# Patient Record
Sex: FEMALE | Race: WHITE | NOT HISPANIC OR LATINO | URBAN - METROPOLITAN AREA
[De-identification: names, ages, dates, MRNs, and addresses within clinical notes are randomized per-mention and may not be internally consistent; named-entity substitution may affect disease eponyms.]

---

## 2017-01-30 ENCOUNTER — OFFICE VISIT - HEALTHEAST (OUTPATIENT)
Dept: FAMILY MEDICINE | Facility: CLINIC | Age: 22
End: 2017-01-30

## 2017-01-30 DIAGNOSIS — Z48.02 ENCOUNTER FOR REMOVAL OF SUTURES: ICD-10-CM

## 2017-02-02 ENCOUNTER — OFFICE VISIT - HEALTHEAST (OUTPATIENT)
Dept: OTOLARYNGOLOGY | Facility: CLINIC | Age: 22
End: 2017-02-02

## 2017-02-02 DIAGNOSIS — J35.1 TONSILLAR HYPERTROPHY: ICD-10-CM

## 2017-02-02 DIAGNOSIS — J35.01 CHRONIC TONSILLITIS: ICD-10-CM

## 2017-02-02 ASSESSMENT — MIFFLIN-ST. JEOR: SCORE: 1669.15

## 2017-02-04 ENCOUNTER — COMMUNICATION - HEALTHEAST (OUTPATIENT)
Dept: FAMILY MEDICINE | Facility: CLINIC | Age: 22
End: 2017-02-04

## 2017-02-04 DIAGNOSIS — F33.1 MAJOR DEPRESSIVE DISORDER, RECURRENT EPISODE, MODERATE (H): ICD-10-CM

## 2017-04-19 ENCOUNTER — OFFICE VISIT - HEALTHEAST (OUTPATIENT)
Dept: FAMILY MEDICINE | Facility: CLINIC | Age: 22
End: 2017-04-19

## 2017-04-19 DIAGNOSIS — J35.01 CHRONIC TONSILLITIS: ICD-10-CM

## 2017-04-19 DIAGNOSIS — Z01.810 PREOP CARDIOVASCULAR EXAM: ICD-10-CM

## 2017-04-19 DIAGNOSIS — F32.1 MODERATE MAJOR DEPRESSION (H): ICD-10-CM

## 2017-04-19 DIAGNOSIS — F10.21 ALCOHOLISM IN REMISSION (H): ICD-10-CM

## 2017-04-19 ASSESSMENT — MIFFLIN-ST. JEOR: SCORE: 1725.39

## 2017-04-20 ENCOUNTER — ANESTHESIA - HEALTHEAST (OUTPATIENT)
Dept: SURGERY | Facility: CLINIC | Age: 22
End: 2017-04-20

## 2017-04-20 ENCOUNTER — COMMUNICATION - HEALTHEAST (OUTPATIENT)
Dept: FAMILY MEDICINE | Facility: CLINIC | Age: 22
End: 2017-04-20

## 2017-04-20 ASSESSMENT — MIFFLIN-ST. JEOR: SCORE: 1725.81

## 2017-04-21 ENCOUNTER — SURGERY - HEALTHEAST (OUTPATIENT)
Dept: SURGERY | Facility: CLINIC | Age: 22
End: 2017-04-21

## 2017-04-21 ASSESSMENT — MIFFLIN-ST. JEOR: SCORE: 1720.63

## 2017-05-18 ENCOUNTER — RECORDS - HEALTHEAST (OUTPATIENT)
Dept: ADMINISTRATIVE | Facility: OTHER | Age: 22
End: 2017-05-18

## 2017-05-22 ENCOUNTER — OFFICE VISIT - HEALTHEAST (OUTPATIENT)
Dept: FAMILY MEDICINE | Facility: CLINIC | Age: 22
End: 2017-05-22

## 2017-05-22 DIAGNOSIS — F10.20 ALCOHOLISM (H): ICD-10-CM

## 2017-05-22 DIAGNOSIS — Z90.89 S/P TONSILLECTOMY: ICD-10-CM

## 2017-05-22 DIAGNOSIS — F17.200 NICOTINE DEPENDENCE: ICD-10-CM

## 2017-05-22 ASSESSMENT — MIFFLIN-ST. JEOR: SCORE: 1790.49

## 2017-05-25 ENCOUNTER — COMMUNICATION - HEALTHEAST (OUTPATIENT)
Dept: FAMILY MEDICINE | Facility: CLINIC | Age: 22
End: 2017-05-25

## 2017-05-25 DIAGNOSIS — F33.1 MAJOR DEPRESSIVE DISORDER, RECURRENT EPISODE, MODERATE (H): ICD-10-CM

## 2017-11-08 ENCOUNTER — RECORDS - HEALTHEAST (OUTPATIENT)
Dept: ADMINISTRATIVE | Facility: OTHER | Age: 22
End: 2017-11-08

## 2017-12-03 ENCOUNTER — COMMUNICATION - HEALTHEAST (OUTPATIENT)
Dept: FAMILY MEDICINE | Facility: CLINIC | Age: 22
End: 2017-12-03

## 2017-12-03 DIAGNOSIS — N92.6 IRREGULAR MENSES: ICD-10-CM

## 2021-05-30 VITALS — HEIGHT: 66 IN | BODY MASS INDEX: 32.78 KG/M2 | WEIGHT: 204 LBS

## 2021-05-30 VITALS — HEIGHT: 66 IN | WEIGHT: 216.4 LBS | BODY MASS INDEX: 34.78 KG/M2

## 2021-05-30 VITALS — WEIGHT: 204 LBS | BODY MASS INDEX: 33.43 KG/M2

## 2021-05-30 VITALS — BODY MASS INDEX: 34.33 KG/M2 | HEIGHT: 66 IN | WEIGHT: 213.6 LBS

## 2021-05-31 VITALS — BODY MASS INDEX: 36.8 KG/M2 | HEIGHT: 66 IN | WEIGHT: 229 LBS

## 2021-06-08 NOTE — PROGRESS NOTES
HPI: This patient is a 20yo F who presents for evaluation of the tonsils. There have been multiple sore throats throughout the last several years leading to missed work/school. Otherwise healthy and without fever, weight loss, odynophagia, dysphagia, hemoptysis, shortness of breath, or other major symptoms. Does snore and states that when she is sick, her tonsils touch in the midline.    Past medical history, surgical history, social history, family history, medications, and allergies have been reviewed with the patient and are documented above.    Review of Systems: a 10-system review was performed. Pertinent positives are noted in the HPI and on a separate scanned document in the chart.    PHYSICAL EXAMINATION:  GEN: no acute distress, normocephalic  EYES: extraocular movements are intact, pupils are equal and round. Sclera clear.   EARS: auricles are normally formed. The external auditory canals are clear with minimal to no cerumen. Tympanic membranes are intact bilaterally with no signs of infection, effusion, retractions, or perforations.  NOSE: anterior nares are patent. There are no masses or lesions. The septum is non-obstructing.  OC/OP: clear, dentition is in good repair. The tongue and palate are fully mobile and symmetric. Tonsils are 3.5+  NECK: soft and supple. No lymphadenopathy or masses. Airway is midline.  NEURO: CN II-XII are intact bilaterally. alert and oriented. No nystagmus. Gait is normal.  PULM: breathing comfortably on room air, normal chest expansion with respiration  HEART: regular rate and rhythm, no peripheral edema    MEDICAL DECISION-MAKING: Cami is a 20yo F with chronic tonsillitis and tonsillar hypertrophy who would benefit from a tonsillectomy. The risks and benefits were discussed and the patient will schedule at their convenience.

## 2021-06-08 NOTE — PROGRESS NOTES
Chief Complaint   Patient presents with     Suture / Staple Removal     placed at [facility] ED on 1/21, approx 11 sutures on Right thigh       HPI:    Patient is here for suture removal, SRs placed 1/21 in ER. She reported doing well without pain from the wound, drainage from wound, redness of wound, fever, chills.    ROS: Pertinent ROS noted in HPI.     Allergies   Allergen Reactions     Adhesive Tape-Silicones Rash     Nicotine Rash     Rash where the patch was placed       Patient Active Problem List   Diagnosis     Moderate major depression     Obesity     Alcoholism /alcohol abuse     Acne vulgaris     Self-mutilation       Family History   Problem Relation Age of Onset     Rheum arthritis Mother      Obesity Mother      Depression Father      Anxiety disorder Father        Social History     Social History     Marital status: Single     Spouse name: N/A     Number of children: 0     Years of education: 12     Occupational History           [employer]     Social History Main Topics     Smoking status: Current Every Day Smoker     Packs/day: 0.50     Smokeless tobacco: Never Used     Alcohol use 37.8 oz/week     63 Shots of liquor per week      Comment: 8-12 shots of hard liquor daily     Drug use: No     Sexual activity: Yes     Partners: Male     Birth control/ protection: OCP      Comment: Inconsistent condom use     Other Topics Concern     Not on file     Social History Narrative         Objective:    Vitals:    01/30/17 1600   BP: 112/70   Pulse: (!) 118   Resp: 16   Temp: 98.8  F (37.1  C)   SpO2: 100%       Gen:NAD  Skin: 7 cm linear wound, with running sutures at right anterolateral mid thigh. No erythema, discharge, flucutance. No dehiscence.     Procedure:    Wound cleaned with betadine and alcohol pads. Sutures were removed in standard fashion without event. Steri strips applied. Patient tolerated procedure well.     Impression:    Encounter for SR removal - wound healing well.      Plan:    Sutures removed in standard fashion without event.  Discussed wound care, signs/symptoms of wound infection.

## 2021-06-10 NOTE — ANESTHESIA CARE TRANSFER NOTE
Last vitals:   Vitals:    04/21/17 0816   BP: 120/77   Pulse: 80   Resp: 12   Temp: 36.8  C (98.3  F)   SpO2: 100%     Patient's level of consciousness is awake  Spontaneous respirations: yes  Maintains airway independently: yes  Dentition unchanged: yes  Oropharynx: oropharynx clear of all foreign objects    QCDR Measures:  ASA# 20 - Surgical Safety Checklist: ASA20A - Safety Checks Done  PQRS# 430 - Adult PONV Prevention: 4558F - Pt received => 2 anti-emetic agents (different classes) preop & intraop  ASA# 8 - Peds PONV Prevention: NA - Not pediatric patient, not GA or 2 or more risk factors NOT present  PQRS# 424 - Leticia-op Temp Management: 4559F - At least one body temp DOCUMENTED => 35.5C or 95.9F within required timeframe  PQRS# 426 - PACU Transfer Protocol: - Transfer of care checklist used  ASA# 14 - Acute Post-op Pain: ASA14B - Patient did NOT experience pain >= 7 out of 10    I completed my SBAR handoff to the receiving nurse per policy and procedure.

## 2021-06-10 NOTE — ANESTHESIA POSTPROCEDURE EVALUATION
Patient: Cami Rockwell    BILATERAL TONSILLECTOMY AND ADENOIDECTOMY  Anesthesia type: general    Patient location: PACU  Last vitals:   Vitals:    04/21/17 0840   BP:    Pulse: 62   Resp: 13   Temp:    SpO2: 98%     Post vital signs: stable  Level of consciousness: awake and responds to simple questions  Post-anesthesia pain: pain controlled  Post-anesthesia nausea and vomiting: no  Pulmonary: unassisted, return to baseline  Cardiovascular: stable and blood pressure at baseline  Hydration: adequate  Anesthetic events: no    QCDR Measures:  ASA# 11 - Leticia-op Cardiac Arrest: ASA11B - Patient did NOT experience unanticipated cardiac arrest  ASA# 12 - Leticia-op Mortality Rate: ASA12B - Patient did NOT die  ASA# 13 - PACU Re-Intubation Rate: ASA13B - Patient did NOT require a new airway mgmt  ASA# 10 - Composite Anes Safety: ASA10A - No serious adverse event  ASA# 38 - New Corneal Injury: ASA38A - No new exposure keratitis or corneal abrasion in PACU    Additional Notes: small skin abrasion from tape on cheek

## 2021-06-10 NOTE — PROGRESS NOTES
Assessment:  1.  Status post tonsillectomy, healing appropriately.  2.  Alcoholism with brief relapse last week.  3.  Nicotine dependence.  Smoking cessation counseling.    Plan: Reassured she was healing appropriately from the tonsillectomy.  Encouraged her regarding her ongoing AA program and counseling etc.  I think it is a positive sign that she is open about her relapse last week and her return to counseling etc.  We discussed the issue of nicotine dependence and options for pursuing smoking cessation.  At this point she does not want to take another pill, I did explain to her the options of bupropion and Chantix.  But for now she wishes to try the use of nicotine gum as an aid to the smoking cessation.  Spent at least 15 minutes with at least 50% in counseling.    Subjective: 21-year-old female who had her tonsillectomy here about 1 month ago.  She has a history of the alcoholism and notes that she did have a relapse last week when she broke up with her boyfriend.  She is restarting counseling and is continuing to attend AA weekly.  She is smoking about one half pack per day.  Past Medical History:   Diagnosis Date     Alcohol abuse 08/01/2016    8-10 shots a day 2015 to 2016     Alcoholism in remission 02/2017    last day of alcohol intake was 2/4/2017     Depression     Major depression, suicide attempt     Obesity      Self-mutilation      Suicide attempt      Thigh laceration 01/20/2017    Self-inflicted 7.5 cm laceration right thigh, repaired in ER 1/21/2017.     Tonsillitis     recurrent     Allergies   Allergen Reactions     Adhesive Tape-Silicones Rash     Nicotine Rash     Rash where the patch was placed     Current Outpatient Prescriptions   Medication Sig Dispense Refill     cholecalciferol, vitamin D3, (VITAMIN D3) 1,000 unit capsule Take 1,000 Units by mouth daily.       ESTARYLLA 0.25-35 mg-mcg per tablet TAKE 1 TABLET BY MOUTH DAILY.  4     sertraline (ZOLOFT) 100 MG tablet TAKE 1.5 TABLETS (150  "MG TOTAL) BY MOUTH DAILY. 135 tablet 0     alum/mag hydrox-simethicone-diphenhydramine-lidocaine (MAGIC MOUTHWASH) suspension Swish and spit 10 mL 4 (four) times a day as needed (throat pain). 500 mL 1     No current facility-administered medications for this visit.      All other review of systems negative.    Objective:/80  Pulse 82  Temp 98.2  F (36.8  C)  Ht 5' 6\" (1.676 m)  Wt (!) 229 lb (103.9 kg)  BMI 36.96 kg/m2  HEENT exam is negative, the oropharynx is healing well.  Next supple.  Lungs clear.  Heart regular rate and rhythm without murmur.  Alert with clear speech.  She is open in her discussions about both the alcohol and the nicotine dependence.  "

## 2021-06-10 NOTE — ANESTHESIA PREPROCEDURE EVALUATION
Anesthesia Evaluation      Patient summary reviewed   No history of anesthetic complications     Airway   Mallampati: II  Neck ROM: full  Comment: Very large tonsils     Pulmonary - normal exam   (+) a smoker                         Cardiovascular - negative ROS and normal exam  Rhythm: regular  Rate: normal,         Neuro/Psych    (+) depression (suicide attempt),     Endo/Other    (+) obesity,      GI/Hepatic/Renal - negative ROS     Comments: Alcohol abuse       Other findings: Off alcohol for 2 months.  No drug hx per patient        Dental - normal exam                 Chemistry        Component Value Date/Time     04/19/2017 1520    K 4.0 04/19/2017 1520     04/19/2017 1520    CO2 29 04/19/2017 1520    BUN 12 04/19/2017 1520    CREATININE 0.85 04/19/2017 1520    GLU 77 04/19/2017 1520        Component Value Date/Time    CALCIUM 9.7 04/19/2017 1520        Lab Results   Component Value Date    WBC 11.1 (H) 04/19/2017    HGB 13.3 04/19/2017    HCT 40.4 04/19/2017    MCV 88 04/19/2017     04/19/2017                  Anesthesia Plan  Planned anesthetic: general endotracheal    ASA 2   Induction: intravenous   Anesthetic plan and risks discussed with: patient    Post-op plan: routine recovery

## 2021-06-10 NOTE — PROGRESS NOTES
Assessment/Plan:      Visit for Preoperative Exam.    1. Preop cardiovascular exam  Basic Metabolic Panel    HM2(CBC w/o Differential)   2. Alcoholism in remission  Basic Metabolic Panel    HM2(CBC w/o Differential)   3. Chronic tonsillitis  Basic Metabolic Panel    HM2(CBC w/o Differential)   4. Moderate major depression           Patient approved for surgery with general or local anesthesia. Labs will be done as indicated. Proceed with proposed surgery without additional clinical clarifications. No active cardiac conditions.   She notes she is considering having the Nexplanon put in for contraception and she can follow-up after she has healed up from her tonsillectomy with 1 of the physicians here at the clinic that does that procedure.  She takes both of her medications in the evening and so she will simply be n.p.o. after midnight on Thursday night.  She is on appropriate medication for her depression, her symptoms are improving, and she follows with Dr. Vann regarding that issue.      Subjective:     Scheduled Procedure: Tonsillectomy  Surgery Date:  4/21  Surgery Location:  [facility]  Surgeon:  Dr. Mcfarland    Had onset of tonsil problems years ago- perhaps 10 years-- had recurrent strep throat and recurrent  Tonsillitis and chronic hypertrophy- saw Dr. Mcfarland and is felt to be a good candidate for  Tonsillectomy.    Current Outpatient Prescriptions   Medication Sig Dispense Refill     ESTARYLLA 0.25-35 mg-mcg per tablet TAKE 1 TABLET BY MOUTH DAILY.  4     sertraline (ZOLOFT) 100 MG tablet TAKE 1.5 TABLETS (150 MG TOTAL) BY MOUTH DAILY. 135 tablet 0     No current facility-administered medications for this visit.        Allergies   Allergen Reactions     Adhesive Tape-Silicones Rash     Nicotine Rash     Rash where the patch was placed       Immunization History   Administered Date(s) Administered     DTaP, historic 02/17/1994, 04/18/1994, 08/24/1994, 08/16/1995, 08/05/1999     HPV Quadrivalent 04/26/2010,  06/30/2010, 01/28/2011     Hep A, historic 06/30/2010, 01/28/2011     IPV 02/17/1994, 04/18/1994, 08/16/1995, 08/05/1999     Influenza, inj, historic 01/04/2011     Influenza, seasonal,quad inj 36+ mos 11/30/2016     MMR 08/16/1995, 08/05/1999     Meningococcal MCV4P 04/13/2009     Tdap 08/21/2006, 06/22/2016       Patient Active Problem List   Diagnosis     Moderate major depression     Obesity     Acne vulgaris     Self-mutilation     Alcoholism in remission     Chronic tonsillitis       Past Medical History:   Diagnosis Date     Alcohol abuse 08/01/2016    8-10 shots a day 2015 to 2016     Alcoholism in remission 02/2017    last day of alcohol intake was 2/4/2017     Depression     Major depression, suicide attempt     Obesity      Self-mutilation      Suicide attempt      Thigh laceration 01/20/2017    Self-inflicted 7.5 cm laceration right thigh, repaired in ER 1/21/2017.     Tonsillitis     recurrent       Social History     Social History     Marital status: Single     Spouse name: N/A     Number of children: 0     Years of education: 12     Occupational History           [employer]     Social History Main Topics     Smoking status: Current Every Day Smoker     Packs/day: 0.50     Smokeless tobacco: Never Used     Alcohol use No     Drug use: No     Sexual activity: Yes     Partners: Male     Birth control/ protection: OCP      Comment: Inconsistent condom use     Other Topics Concern     Not on file     Social History Narrative       Past Surgical History:   Procedure Laterality Date     WISDOM TOOTH EXTRACTION  2013    had anesthesia for this       History of Present Illness  Recent Health  Fever: no  Chills: no  Fatigue: a little  Chest Pain: no  Cough: no  Dyspnea: no  Urinary Frequency: no  Nausea: no  Vomiting: no  Diarrhea: no  Abdominal Pain: no  Easy Bruising: no  Lower Extremity Swelling: no  Poor Exercise Tolerance: no    Most recent Health Maintenance Visit:      Pertinent  "History  Prior Anesthesia: yes  Previous Anesthesia Reaction:  no  Diabetes: no  Cardiovascular Disease: no  Pulmonary Disease: no  Renal Disease: no  GI Disease: no  Sleep Apnea: no  Thromboembolic Problems: no  Clotting Disorder: no  Bleeding Disorder: no  Transfusion Reaction: no  Impaired Immunity: no  Steroid use in the last 6 months: no  Frequent Aspirin use: no    Family history of no anesthetic reaction    Social history of patient does not wear denture or partial plates, there is no transfusion refusal and there are no concerns regarding care after surgery    After surgery, the patient plans to recover at home with family.    Review of Systems  Review of Systems   All other ROS are negative. Is going to - had succesful treatment for her alcoholism in February 2017.  PHQ-9 score today is 11.  She notes that she just started working a new job here in the last several days and she states that she is already feeling better in terms of her motivation and interest in getting out to do things.  She certainly sounds sober in terms of her talk about her alcoholism treatment and her ongoing attending of  etc.          Objective:         Vitals:    04/19/17 1439   BP: 100/70   Pulse: 60   Temp: 98.9  F (37.2  C)   Weight: 216 lb 6.4 oz (98.2 kg)   Height: 5' 5.5\" (1.664 m)       Physical Exam:  Physical Exam   Alert female.  Head normocephalic.  External ears and TMs normal.  Eyes show pupils equal round and reactive to light and accommodation and normal funduscopic exam.  Nose unremarkable.  Oropharynx shows significant tonsillar hypertrophy, not touching in the midline but close to it.  Neck supple without adenopathy or thyromegaly.  Lungs are clear to auscultation.  Heart regular rate and rhythm without murmur.  Abdomen shows no masses tenderness or hepatosplenomegaly.  Epic examination deferred.  Extremities show no edema.  Peripheral pulses are full.  Skin shows no acute change.  She is alert with clear " speech, normal affect and normal mood.